# Patient Record
Sex: FEMALE | Race: WHITE | NOT HISPANIC OR LATINO
[De-identification: names, ages, dates, MRNs, and addresses within clinical notes are randomized per-mention and may not be internally consistent; named-entity substitution may affect disease eponyms.]

---

## 2018-04-05 ENCOUNTER — LABORATORY RESULT (OUTPATIENT)
Age: 48
End: 2018-04-05

## 2018-04-05 ENCOUNTER — APPOINTMENT (OUTPATIENT)
Dept: OBGYN | Facility: CLINIC | Age: 48
End: 2018-04-05
Payer: COMMERCIAL

## 2018-04-05 ENCOUNTER — OUTPATIENT (OUTPATIENT)
Dept: OUTPATIENT SERVICES | Facility: HOSPITAL | Age: 48
LOS: 1 days | Discharge: HOME | End: 2018-04-05

## 2018-04-05 VITALS — BODY MASS INDEX: 25.76 KG/M2 | WEIGHT: 140 LBS | HEIGHT: 62 IN

## 2018-04-05 DIAGNOSIS — Z01.419 ENCOUNTER FOR GYNECOLOGICAL EXAMINATION (GENERAL) (ROUTINE) WITHOUT ABNORMAL FINDINGS: ICD-10-CM

## 2018-04-05 PROCEDURE — 99396 PREV VISIT EST AGE 40-64: CPT

## 2018-04-05 PROCEDURE — 81003 URINALYSIS AUTO W/O SCOPE: CPT | Mod: QW

## 2018-04-22 ENCOUNTER — RX RENEWAL (OUTPATIENT)
Age: 48
End: 2018-04-22

## 2018-10-02 ENCOUNTER — APPOINTMENT (OUTPATIENT)
Dept: OBGYN | Facility: CLINIC | Age: 48
End: 2018-10-02
Payer: COMMERCIAL

## 2018-10-02 VITALS — WEIGHT: 145 LBS | BODY MASS INDEX: 26.68 KG/M2 | HEIGHT: 62 IN

## 2018-10-02 PROCEDURE — 99213 OFFICE O/P EST LOW 20 MIN: CPT

## 2018-11-11 LAB
BILIRUB UR QL STRIP: NORMAL
CLARITY UR: CLEAR
GLUCOSE UR-MCNC: NORMAL
HCG UR QL: NORMAL EU/DL
HGB UR QL STRIP.AUTO: NORMAL
KETONES UR-MCNC: NORMAL
LEUKOCYTE ESTERASE UR QL STRIP: NORMAL
NITRITE UR QL STRIP: NORMAL
PH UR STRIP: 5
PROT UR STRIP-MCNC: NORMAL
SP GR UR STRIP: 1.02

## 2019-04-08 ENCOUNTER — OUTPATIENT (OUTPATIENT)
Dept: OUTPATIENT SERVICES | Facility: HOSPITAL | Age: 49
LOS: 1 days | Discharge: HOME | End: 2019-04-08

## 2019-04-08 VITALS
TEMPERATURE: 98 F | HEART RATE: 72 BPM | SYSTOLIC BLOOD PRESSURE: 126 MMHG | DIASTOLIC BLOOD PRESSURE: 75 MMHG | WEIGHT: 147.05 LBS | HEIGHT: 62 IN | OXYGEN SATURATION: 99 % | RESPIRATION RATE: 18 BRPM

## 2019-04-08 DIAGNOSIS — Z01.818 ENCOUNTER FOR OTHER PREPROCEDURAL EXAMINATION: ICD-10-CM

## 2019-04-08 DIAGNOSIS — Z98.890 OTHER SPECIFIED POSTPROCEDURAL STATES: Chronic | ICD-10-CM

## 2019-04-08 DIAGNOSIS — M20.21 HALLUX RIGIDUS, RIGHT FOOT: ICD-10-CM

## 2019-04-08 LAB
ALBUMIN SERPL ELPH-MCNC: 3.7 G/DL — SIGNIFICANT CHANGE UP (ref 3.5–5.2)
ALP SERPL-CCNC: 38 U/L — SIGNIFICANT CHANGE UP (ref 30–115)
ALT FLD-CCNC: 11 U/L — SIGNIFICANT CHANGE UP (ref 0–41)
ANION GAP SERPL CALC-SCNC: 13 MMOL/L — SIGNIFICANT CHANGE UP (ref 7–14)
APPEARANCE UR: ABNORMAL
APTT BLD: 27.8 SEC — SIGNIFICANT CHANGE UP (ref 27–39.2)
AST SERPL-CCNC: 13 U/L — SIGNIFICANT CHANGE UP (ref 0–41)
BASOPHILS # BLD AUTO: 0.03 K/UL — SIGNIFICANT CHANGE UP (ref 0–0.2)
BASOPHILS NFR BLD AUTO: 0.5 % — SIGNIFICANT CHANGE UP (ref 0–1)
BILIRUB SERPL-MCNC: 0.4 MG/DL — SIGNIFICANT CHANGE UP (ref 0.2–1.2)
BILIRUB UR-MCNC: NEGATIVE — SIGNIFICANT CHANGE UP
BUN SERPL-MCNC: 17 MG/DL — SIGNIFICANT CHANGE UP (ref 10–20)
CALCIUM SERPL-MCNC: 8.8 MG/DL — SIGNIFICANT CHANGE UP (ref 8.5–10.1)
CHLORIDE SERPL-SCNC: 104 MMOL/L — SIGNIFICANT CHANGE UP (ref 98–110)
CO2 SERPL-SCNC: 22 MMOL/L — SIGNIFICANT CHANGE UP (ref 17–32)
COLOR SPEC: YELLOW — SIGNIFICANT CHANGE UP
CREAT SERPL-MCNC: 0.6 MG/DL — LOW (ref 0.7–1.5)
DIFF PNL FLD: NEGATIVE — SIGNIFICANT CHANGE UP
EOSINOPHIL # BLD AUTO: 0.24 K/UL — SIGNIFICANT CHANGE UP (ref 0–0.7)
EOSINOPHIL NFR BLD AUTO: 3.8 % — SIGNIFICANT CHANGE UP (ref 0–8)
EPI CELLS # UR: ABNORMAL /HPF
GLUCOSE SERPL-MCNC: 85 MG/DL — SIGNIFICANT CHANGE UP (ref 70–99)
GLUCOSE UR QL: NEGATIVE — SIGNIFICANT CHANGE UP
HCT VFR BLD CALC: 41.2 % — SIGNIFICANT CHANGE UP (ref 37–47)
HGB BLD-MCNC: 13.6 G/DL — SIGNIFICANT CHANGE UP (ref 12–16)
IMM GRANULOCYTES NFR BLD AUTO: 0.5 % — HIGH (ref 0.1–0.3)
INR BLD: 0.98 RATIO — SIGNIFICANT CHANGE UP (ref 0.65–1.3)
KETONES UR-MCNC: NEGATIVE — SIGNIFICANT CHANGE UP
LEUKOCYTE ESTERASE UR-ACNC: NEGATIVE — SIGNIFICANT CHANGE UP
LYMPHOCYTES # BLD AUTO: 1.62 K/UL — SIGNIFICANT CHANGE UP (ref 1.2–3.4)
LYMPHOCYTES # BLD AUTO: 25.4 % — SIGNIFICANT CHANGE UP (ref 20.5–51.1)
MCHC RBC-ENTMCNC: 30.4 PG — SIGNIFICANT CHANGE UP (ref 27–31)
MCHC RBC-ENTMCNC: 33 G/DL — SIGNIFICANT CHANGE UP (ref 32–37)
MCV RBC AUTO: 92.2 FL — SIGNIFICANT CHANGE UP (ref 81–99)
MONOCYTES # BLD AUTO: 0.67 K/UL — HIGH (ref 0.1–0.6)
MONOCYTES NFR BLD AUTO: 10.5 % — HIGH (ref 1.7–9.3)
NEUTROPHILS # BLD AUTO: 3.8 K/UL — SIGNIFICANT CHANGE UP (ref 1.4–6.5)
NEUTROPHILS NFR BLD AUTO: 59.3 % — SIGNIFICANT CHANGE UP (ref 42.2–75.2)
NITRITE UR-MCNC: NEGATIVE — SIGNIFICANT CHANGE UP
NRBC # BLD: 0 /100 WBCS — SIGNIFICANT CHANGE UP (ref 0–0)
PH UR: 6 — SIGNIFICANT CHANGE UP (ref 5–8)
PLATELET # BLD AUTO: 363 K/UL — SIGNIFICANT CHANGE UP (ref 130–400)
POTASSIUM SERPL-MCNC: 4.4 MMOL/L — SIGNIFICANT CHANGE UP (ref 3.5–5)
POTASSIUM SERPL-SCNC: 4.4 MMOL/L — SIGNIFICANT CHANGE UP (ref 3.5–5)
PROT SERPL-MCNC: 6.8 G/DL — SIGNIFICANT CHANGE UP (ref 6–8)
PROT UR-MCNC: NEGATIVE — SIGNIFICANT CHANGE UP
PROTHROM AB SERPL-ACNC: 11.3 SEC — SIGNIFICANT CHANGE UP (ref 9.95–12.87)
RBC # BLD: 4.47 M/UL — SIGNIFICANT CHANGE UP (ref 4.2–5.4)
RBC # FLD: 13.2 % — SIGNIFICANT CHANGE UP (ref 11.5–14.5)
RBC CASTS # UR COMP ASSIST: SIGNIFICANT CHANGE UP /HPF
SODIUM SERPL-SCNC: 139 MMOL/L — SIGNIFICANT CHANGE UP (ref 135–146)
SP GR SPEC: >=1.03 — SIGNIFICANT CHANGE UP (ref 1.01–1.03)
UROBILINOGEN FLD QL: 0.2 — SIGNIFICANT CHANGE UP (ref 0.2–0.2)
WBC # BLD: 6.39 K/UL — SIGNIFICANT CHANGE UP (ref 4.8–10.8)
WBC # FLD AUTO: 6.39 K/UL — SIGNIFICANT CHANGE UP (ref 4.8–10.8)

## 2019-04-15 ENCOUNTER — APPOINTMENT (OUTPATIENT)
Dept: OBGYN | Facility: CLINIC | Age: 49
End: 2019-04-15
Payer: COMMERCIAL

## 2019-04-15 ENCOUNTER — OUTPATIENT (OUTPATIENT)
Dept: OUTPATIENT SERVICES | Facility: HOSPITAL | Age: 49
LOS: 1 days | Discharge: HOME | End: 2019-04-15

## 2019-04-15 ENCOUNTER — LABORATORY RESULT (OUTPATIENT)
Age: 49
End: 2019-04-15

## 2019-04-15 VITALS — WEIGHT: 150 LBS | BODY MASS INDEX: 27.6 KG/M2 | HEIGHT: 62 IN

## 2019-04-15 DIAGNOSIS — Z01.419 ENCOUNTER FOR GYNECOLOGICAL EXAMINATION (GENERAL) (ROUTINE) WITHOUT ABNORMAL FINDINGS: ICD-10-CM

## 2019-04-15 DIAGNOSIS — Z98.890 OTHER SPECIFIED POSTPROCEDURAL STATES: Chronic | ICD-10-CM

## 2019-04-15 PROBLEM — R94.31 ABNORMAL ELECTROCARDIOGRAM [ECG] [EKG]: Chronic | Status: ACTIVE | Noted: 2019-04-08

## 2019-04-15 PROCEDURE — 99396 PREV VISIT EST AGE 40-64: CPT

## 2019-04-15 PROCEDURE — 81003 URINALYSIS AUTO W/O SCOPE: CPT | Mod: QW

## 2019-04-19 ENCOUNTER — OUTPATIENT (OUTPATIENT)
Dept: OUTPATIENT SERVICES | Facility: HOSPITAL | Age: 49
LOS: 1 days | Discharge: HOME | End: 2019-04-19
Payer: COMMERCIAL

## 2019-04-19 ENCOUNTER — RESULT REVIEW (OUTPATIENT)
Age: 49
End: 2019-04-19

## 2019-04-19 VITALS
DIASTOLIC BLOOD PRESSURE: 60 MMHG | SYSTOLIC BLOOD PRESSURE: 120 MMHG | HEART RATE: 74 BPM | WEIGHT: 149.03 LBS | TEMPERATURE: 98 F | RESPIRATION RATE: 17 BRPM | OXYGEN SATURATION: 98 % | HEIGHT: 62 IN

## 2019-04-19 VITALS — SYSTOLIC BLOOD PRESSURE: 122 MMHG | DIASTOLIC BLOOD PRESSURE: 64 MMHG | HEART RATE: 74 BPM | RESPIRATION RATE: 18 BRPM

## 2019-04-19 DIAGNOSIS — M21.612 BUNION OF LEFT FOOT: ICD-10-CM

## 2019-04-19 DIAGNOSIS — Z98.890 OTHER SPECIFIED POSTPROCEDURAL STATES: Chronic | ICD-10-CM

## 2019-04-19 PROCEDURE — 88311 DECALCIFY TISSUE: CPT | Mod: 26

## 2019-04-19 PROCEDURE — 88304 TISSUE EXAM BY PATHOLOGIST: CPT | Mod: 26

## 2019-04-19 RX ORDER — ONDANSETRON 8 MG/1
4 TABLET, FILM COATED ORAL ONCE
Qty: 0 | Refills: 0 | Status: DISCONTINUED | OUTPATIENT
Start: 2019-04-19 | End: 2019-04-19

## 2019-04-19 RX ORDER — OXYCODONE AND ACETAMINOPHEN 5; 325 MG/1; MG/1
1 TABLET ORAL EVERY 4 HOURS
Qty: 0 | Refills: 0 | Status: DISCONTINUED | OUTPATIENT
Start: 2019-04-19 | End: 2019-04-19

## 2019-04-19 RX ORDER — MORPHINE SULFATE 50 MG/1
2 CAPSULE, EXTENDED RELEASE ORAL
Qty: 0 | Refills: 0 | Status: DISCONTINUED | OUTPATIENT
Start: 2019-04-19 | End: 2019-04-19

## 2019-04-19 RX ORDER — SODIUM CHLORIDE 9 MG/ML
1000 INJECTION, SOLUTION INTRAVENOUS
Qty: 0 | Refills: 0 | Status: DISCONTINUED | OUTPATIENT
Start: 2019-04-19 | End: 2019-04-19

## 2019-04-19 NOTE — PRE-ANESTHESIA EVALUATION ADULT - NSANTHOSAYNRD_GEN_A_CORE
see barak sheet/No. BARAK screening performed.  STOP BANG Legend: 0-2 = LOW Risk; 3-4 = INTERMEDIATE Risk; 5-8 = HIGH Risk

## 2019-04-19 NOTE — PROGRESS NOTE ADULT - SUBJECTIVE AND OBJECTIVE BOX
Podiatry Pre-Operative Note     [id number] ASHLEY HAMM is a 49y year old Female patient presenting to the operating room for surgical management of the Left foot. The patient has failed all conservative measures and requires surgical intervention at this time.    PAST MEDICAL & SURGICAL HISTORY:  EKG, abnormal  History of surgery: right bunion sx, gyn procedure    Medications:   Taytulla 1 mg-20 mcg oral capsule: 1 cap(s) orally once a day    Allergies  penicillins (Unknown)    Consent [Done]  H&P [PAST]  Medical Clearance [PAST]    Surgeon: Dr. Anthony Krueger DPM  Assistant (s): Dr. Kvng Hodge DPM  Pre Operative Diagnosis: Recalcitrant painful Left foot bunion deformity  Planned Procedure: Left [place] Bunion with screw fixation     The patient has been educated on the above procedure, with all risks and benefits described. No guarantees were given or implied for the aforementioned procedure.  The above assistant(s) have introduced themselves to the patient. The patient consents to their participation in the procedure listed above.   04-19-19 @ 08:09

## 2019-04-19 NOTE — ASU DISCHARGE PLAN (ADULT/PEDIATRIC) - CALL YOUR DOCTOR IF YOU HAVE ANY OF THE FOLLOWING:
Nausea and vomiting that does not stop/Unable to urinate/Wound/Surgical Site with redness, or foul smelling discharge or pus/Increased irritability or sluggishness/Inability to tolerate liquids or foods/Bleeding that does not stop/Excessive diarrhea/Numbness, tingling, color or temperature change to extremity/Swelling that gets worse/Pain not relieved by Medications

## 2019-04-19 NOTE — ASU DISCHARGE PLAN (ADULT/PEDIATRIC) - ASU DC SPECIAL INSTRUCTIONSFT
Please keep Left foot dressing clean dry and intact.   Please follow up with Dr. Anthony Krueger in one week.

## 2019-04-21 LAB
BILIRUB UR QL STRIP: NORMAL
CLARITY UR: CLEAR
GLUCOSE UR-MCNC: NORMAL
HCG UR QL: NORMAL EU/DL
HGB UR QL STRIP.AUTO: NORMAL
KETONES UR-MCNC: NORMAL
LEUKOCYTE ESTERASE UR QL STRIP: 25
NITRITE UR QL STRIP: NORMAL
PH UR STRIP: 8
PROT UR STRIP-MCNC: NORMAL
SP GR UR STRIP: 1.02

## 2019-04-23 LAB — SURGICAL PATHOLOGY STUDY: SIGNIFICANT CHANGE UP

## 2019-04-26 DIAGNOSIS — M19.072 PRIMARY OSTEOARTHRITIS, LEFT ANKLE AND FOOT: ICD-10-CM

## 2019-04-26 DIAGNOSIS — M20.5X2 OTHER DEFORMITIES OF TOE(S) (ACQUIRED), LEFT FOOT: ICD-10-CM

## 2019-04-26 DIAGNOSIS — Z88.0 ALLERGY STATUS TO PENICILLIN: ICD-10-CM

## 2019-09-16 ENCOUNTER — APPOINTMENT (OUTPATIENT)
Dept: OBGYN | Facility: CLINIC | Age: 49
End: 2019-09-16

## 2019-10-14 ENCOUNTER — APPOINTMENT (OUTPATIENT)
Dept: OBGYN | Facility: CLINIC | Age: 49
End: 2019-10-14
Payer: COMMERCIAL

## 2019-10-14 VITALS — HEIGHT: 62 IN | WEIGHT: 144 LBS | BODY MASS INDEX: 26.5 KG/M2

## 2019-10-14 PROCEDURE — 77085 DXA BONE DENSITY AXL VRT FX: CPT

## 2019-10-14 PROCEDURE — 81003 URINALYSIS AUTO W/O SCOPE: CPT | Mod: QW

## 2019-10-14 PROCEDURE — 99213 OFFICE O/P EST LOW 20 MIN: CPT

## 2019-11-02 LAB
BILIRUB UR QL STRIP: NORMAL
CLARITY UR: CLEAR
GLUCOSE UR-MCNC: NORMAL
HCG UR QL: NORMAL EU/DL
HGB UR QL STRIP.AUTO: NORMAL
KETONES UR-MCNC: NORMAL
LEUKOCYTE ESTERASE UR QL STRIP: NORMAL
NITRITE UR QL STRIP: NORMAL
PH UR STRIP: 5
PROT UR STRIP-MCNC: NORMAL
SP GR UR STRIP: 1

## 2019-12-17 NOTE — H&P PST ADULT - TEMPERATURE IN CELSIUS (DEGREES C)
----- Message from Wali Umana MD sent at 12/17/2019  8:10 AM CST -----  Lab recheck:    Her creatinine, which looks at kidney function, increased from 10 to 30. This is over a 30% increase, so I want to decrease her dose from 5 to 3mg/kg for a month. This calculates to be 100mg BID or 1 pill twice daily (previously was on 2 pills twice daily).    She also has hyperuricemia, or high uric acid which is also a side effect of the cyclosporine. This likely is 2/2 to her kidney function not eliminating the excess uric acid. As long as she has no side effects, no intervention is needed. However, any issues with urination including pain in her back/side/abdomen, nausea, pain or difficulty urinating, as well as symptoms of gout which is joint pain in fingers, let us know.    Lipid level shows an increase in triglycerides, also a side effect of cyclosporine. A diet high in fruits/vegetables and lower in red meat and fast food will help this, no other intervention necessary at this time.    Her Magnesium level is wnl with oral supplementation.    CBC is unchanged, liver function also unchanged.    F/u as planned.     36.8

## 2020-02-25 ENCOUNTER — APPOINTMENT (OUTPATIENT)
Dept: OBGYN | Facility: CLINIC | Age: 50
End: 2020-02-25
Payer: COMMERCIAL

## 2020-02-25 ENCOUNTER — LABORATORY RESULT (OUTPATIENT)
Age: 50
End: 2020-02-25

## 2020-02-25 VITALS — HEIGHT: 62 IN | BODY MASS INDEX: 26.87 KG/M2 | WEIGHT: 146 LBS

## 2020-02-25 PROCEDURE — 58100 BIOPSY OF UTERUS LINING: CPT

## 2020-02-25 PROCEDURE — 99213 OFFICE O/P EST LOW 20 MIN: CPT | Mod: 25

## 2020-03-06 ENCOUNTER — APPOINTMENT (OUTPATIENT)
Dept: OBGYN | Facility: CLINIC | Age: 50
End: 2020-03-06
Payer: COMMERCIAL

## 2020-03-06 PROCEDURE — 76830 TRANSVAGINAL US NON-OB: CPT

## 2020-03-06 PROCEDURE — 93975 VASCULAR STUDY: CPT

## 2020-06-19 ENCOUNTER — OUTPATIENT (OUTPATIENT)
Dept: OUTPATIENT SERVICES | Facility: HOSPITAL | Age: 50
LOS: 1 days | Discharge: HOME | End: 2020-06-19
Payer: COMMERCIAL

## 2020-06-19 VITALS
WEIGHT: 154.1 LBS | SYSTOLIC BLOOD PRESSURE: 133 MMHG | OXYGEN SATURATION: 99 % | HEART RATE: 68 BPM | TEMPERATURE: 98 F | DIASTOLIC BLOOD PRESSURE: 75 MMHG | HEIGHT: 61 IN | RESPIRATION RATE: 16 BRPM

## 2020-06-19 DIAGNOSIS — Z01.818 ENCOUNTER FOR OTHER PREPROCEDURAL EXAMINATION: ICD-10-CM

## 2020-06-19 DIAGNOSIS — N84.0 POLYP OF CORPUS UTERI: ICD-10-CM

## 2020-06-19 DIAGNOSIS — Z98.890 OTHER SPECIFIED POSTPROCEDURAL STATES: Chronic | ICD-10-CM

## 2020-06-19 LAB
ALBUMIN SERPL ELPH-MCNC: 4.2 G/DL — SIGNIFICANT CHANGE UP (ref 3.5–5.2)
ALP SERPL-CCNC: 69 U/L — SIGNIFICANT CHANGE UP (ref 30–115)
ALT FLD-CCNC: 20 U/L — SIGNIFICANT CHANGE UP (ref 0–41)
ANION GAP SERPL CALC-SCNC: 15 MMOL/L — HIGH (ref 7–14)
APPEARANCE UR: CLEAR — SIGNIFICANT CHANGE UP
APTT BLD: 30.7 SEC — SIGNIFICANT CHANGE UP (ref 27–39.2)
AST SERPL-CCNC: 26 U/L — SIGNIFICANT CHANGE UP (ref 0–41)
BASOPHILS # BLD AUTO: 0.04 K/UL — SIGNIFICANT CHANGE UP (ref 0–0.2)
BASOPHILS NFR BLD AUTO: 0.7 % — SIGNIFICANT CHANGE UP (ref 0–1)
BILIRUB SERPL-MCNC: 0.3 MG/DL — SIGNIFICANT CHANGE UP (ref 0.2–1.2)
BILIRUB UR-MCNC: NEGATIVE — SIGNIFICANT CHANGE UP
BUN SERPL-MCNC: 13 MG/DL — SIGNIFICANT CHANGE UP (ref 10–20)
CALCIUM SERPL-MCNC: 9.6 MG/DL — SIGNIFICANT CHANGE UP (ref 8.5–10.1)
CHLORIDE SERPL-SCNC: 100 MMOL/L — SIGNIFICANT CHANGE UP (ref 98–110)
CO2 SERPL-SCNC: 25 MMOL/L — SIGNIFICANT CHANGE UP (ref 17–32)
COLOR SPEC: YELLOW — SIGNIFICANT CHANGE UP
CREAT SERPL-MCNC: 0.7 MG/DL — SIGNIFICANT CHANGE UP (ref 0.7–1.5)
DIFF PNL FLD: NEGATIVE — SIGNIFICANT CHANGE UP
EOSINOPHIL # BLD AUTO: 0.2 K/UL — SIGNIFICANT CHANGE UP (ref 0–0.7)
EOSINOPHIL NFR BLD AUTO: 3.4 % — SIGNIFICANT CHANGE UP (ref 0–8)
GLUCOSE SERPL-MCNC: 91 MG/DL — SIGNIFICANT CHANGE UP (ref 70–99)
GLUCOSE UR QL: NEGATIVE — SIGNIFICANT CHANGE UP
HCT VFR BLD CALC: 42 % — SIGNIFICANT CHANGE UP (ref 37–47)
HGB BLD-MCNC: 13.2 G/DL — SIGNIFICANT CHANGE UP (ref 12–16)
IMM GRANULOCYTES NFR BLD AUTO: 0.2 % — SIGNIFICANT CHANGE UP (ref 0.1–0.3)
INR BLD: 0.95 RATIO — SIGNIFICANT CHANGE UP (ref 0.65–1.3)
KETONES UR-MCNC: NEGATIVE — SIGNIFICANT CHANGE UP
LEUKOCYTE ESTERASE UR-ACNC: NEGATIVE — SIGNIFICANT CHANGE UP
LYMPHOCYTES # BLD AUTO: 1.67 K/UL — SIGNIFICANT CHANGE UP (ref 1.2–3.4)
LYMPHOCYTES # BLD AUTO: 28.8 % — SIGNIFICANT CHANGE UP (ref 20.5–51.1)
MCHC RBC-ENTMCNC: 28.8 PG — SIGNIFICANT CHANGE UP (ref 27–31)
MCHC RBC-ENTMCNC: 31.4 G/DL — LOW (ref 32–37)
MCV RBC AUTO: 91.7 FL — SIGNIFICANT CHANGE UP (ref 81–99)
MONOCYTES # BLD AUTO: 0.7 K/UL — HIGH (ref 0.1–0.6)
MONOCYTES NFR BLD AUTO: 12.1 % — HIGH (ref 1.7–9.3)
NEUTROPHILS # BLD AUTO: 3.18 K/UL — SIGNIFICANT CHANGE UP (ref 1.4–6.5)
NEUTROPHILS NFR BLD AUTO: 54.8 % — SIGNIFICANT CHANGE UP (ref 42.2–75.2)
NITRITE UR-MCNC: NEGATIVE — SIGNIFICANT CHANGE UP
NRBC # BLD: 0 /100 WBCS — SIGNIFICANT CHANGE UP (ref 0–0)
PH UR: 5.5 — SIGNIFICANT CHANGE UP (ref 5–8)
PLATELET # BLD AUTO: 388 K/UL — SIGNIFICANT CHANGE UP (ref 130–400)
POTASSIUM SERPL-MCNC: 4.7 MMOL/L — SIGNIFICANT CHANGE UP (ref 3.5–5)
POTASSIUM SERPL-SCNC: 4.7 MMOL/L — SIGNIFICANT CHANGE UP (ref 3.5–5)
PROT SERPL-MCNC: 7.9 G/DL — SIGNIFICANT CHANGE UP (ref 6–8)
PROT UR-MCNC: SIGNIFICANT CHANGE UP
PROTHROM AB SERPL-ACNC: 10.9 SEC — SIGNIFICANT CHANGE UP (ref 9.95–12.87)
RBC # BLD: 4.58 M/UL — SIGNIFICANT CHANGE UP (ref 4.2–5.4)
RBC # FLD: 12.7 % — SIGNIFICANT CHANGE UP (ref 11.5–14.5)
SODIUM SERPL-SCNC: 140 MMOL/L — SIGNIFICANT CHANGE UP (ref 135–146)
SP GR SPEC: 1.02 — SIGNIFICANT CHANGE UP (ref 1.01–1.02)
UROBILINOGEN FLD QL: SIGNIFICANT CHANGE UP
WBC # BLD: 5.8 K/UL — SIGNIFICANT CHANGE UP (ref 4.8–10.8)
WBC # FLD AUTO: 5.8 K/UL — SIGNIFICANT CHANGE UP (ref 4.8–10.8)

## 2020-06-19 PROCEDURE — 93010 ELECTROCARDIOGRAM REPORT: CPT

## 2020-06-19 RX ORDER — NORETHINDRONE AND ETHINYL ESTRADIOL 0.4-0.035
1 KIT ORAL
Qty: 0 | Refills: 0 | DISCHARGE

## 2020-06-19 NOTE — H&P PST ADULT - HISTORY OF PRESENT ILLNESS
49 yo female presents w/ c/o "abnormal periods since july last year, my blood work is saying i should not even be getting my periods", scheduled for d&c;  denies chest pain, palpitations, shortness of breath, dyspnea, or dysuria. exercise tolerance: 4 + blocks/ flights of stairs w/o sob   pt denies any known exposure to COVID-19, denies any S&S

## 2020-06-20 ENCOUNTER — LABORATORY RESULT (OUTPATIENT)
Age: 50
End: 2020-06-20

## 2020-06-22 ENCOUNTER — APPOINTMENT (OUTPATIENT)
Dept: CARDIOLOGY | Facility: CLINIC | Age: 50
End: 2020-06-22
Payer: COMMERCIAL

## 2020-06-22 PROCEDURE — 99213 OFFICE O/P EST LOW 20 MIN: CPT

## 2020-06-22 PROCEDURE — 93000 ELECTROCARDIOGRAM COMPLETE: CPT

## 2020-06-23 ENCOUNTER — APPOINTMENT (OUTPATIENT)
Dept: OBGYN | Facility: HOSPITAL | Age: 50
End: 2020-06-23

## 2020-06-23 ENCOUNTER — OUTPATIENT (OUTPATIENT)
Dept: OUTPATIENT SERVICES | Facility: HOSPITAL | Age: 50
LOS: 1 days | Discharge: HOME | End: 2020-06-23
Payer: COMMERCIAL

## 2020-06-23 ENCOUNTER — RESULT REVIEW (OUTPATIENT)
Age: 50
End: 2020-06-23

## 2020-06-23 VITALS
HEIGHT: 61 IN | WEIGHT: 154.1 LBS | DIASTOLIC BLOOD PRESSURE: 60 MMHG | TEMPERATURE: 98 F | RESPIRATION RATE: 20 BRPM | HEART RATE: 67 BPM | OXYGEN SATURATION: 100 % | SYSTOLIC BLOOD PRESSURE: 122 MMHG

## 2020-06-23 VITALS
SYSTOLIC BLOOD PRESSURE: 138 MMHG | DIASTOLIC BLOOD PRESSURE: 79 MMHG | RESPIRATION RATE: 18 BRPM | OXYGEN SATURATION: 100 % | HEART RATE: 60 BPM

## 2020-06-23 DIAGNOSIS — Z98.890 OTHER SPECIFIED POSTPROCEDURAL STATES: Chronic | ICD-10-CM

## 2020-06-23 PROCEDURE — 58558 HYSTEROSCOPY BIOPSY: CPT

## 2020-06-23 PROCEDURE — 88305 TISSUE EXAM BY PATHOLOGIST: CPT | Mod: 26

## 2020-06-23 RX ORDER — ONDANSETRON 8 MG/1
4 TABLET, FILM COATED ORAL ONCE
Refills: 0 | Status: DISCONTINUED | OUTPATIENT
Start: 2020-06-23 | End: 2020-07-08

## 2020-06-23 RX ORDER — SODIUM CHLORIDE 9 MG/ML
1000 INJECTION, SOLUTION INTRAVENOUS
Refills: 0 | Status: DISCONTINUED | OUTPATIENT
Start: 2020-06-23 | End: 2020-07-08

## 2020-06-23 RX ORDER — IBUPROFEN 200 MG
600 TABLET ORAL ONCE
Refills: 0 | Status: DISCONTINUED | OUTPATIENT
Start: 2020-06-23 | End: 2020-07-08

## 2020-06-23 RX ORDER — HYDROMORPHONE HYDROCHLORIDE 2 MG/ML
0.5 INJECTION INTRAMUSCULAR; INTRAVENOUS; SUBCUTANEOUS
Refills: 0 | Status: DISCONTINUED | OUTPATIENT
Start: 2020-06-23 | End: 2020-06-23

## 2020-06-23 RX ADMIN — SODIUM CHLORIDE 100 MILLILITER(S): 9 INJECTION, SOLUTION INTRAVENOUS at 08:09

## 2020-06-23 NOTE — PRE-ANESTHESIA EVALUATION ADULT - NSANTHOSAYNRD_GEN_A_CORE
never tested, see screening tool/No. SAM screening performed.  STOP BANG Legend: 0-2 = LOW Risk; 3-4 = INTERMEDIATE Risk; 5-8 = HIGH Risk

## 2020-06-23 NOTE — ASU DISCHARGE PLAN (ADULT/PEDIATRIC) - CARE PROVIDER_API CALL
Alfredo Martin  OBSTETRICS AND GYNECOLOGY  [address] [place] [address]  Phone: [phone]  Fax: [phone]  Follow Up Time: 2 weeks

## 2020-06-23 NOTE — BRIEF OPERATIVE NOTE - NSICDXBRIEFPOSTOP_GEN_ALL_CORE_FT
POST-OP DIAGNOSIS:  Atrophic endometrium 23-Jun-2020 08:11:53  Jos Layton  Postmenopausal bleeding 23-Jun-2020 08:11:41  Jos Layton

## 2020-06-23 NOTE — BRIEF OPERATIVE NOTE - NSICDXBRIEFPREOP_GEN_ALL_CORE_FT
PRE-OP DIAGNOSIS:  Thickened endometrium 23-Jun-2020 08:11:16  Jos Layton  Postmenopausal bleeding 23-Jun-2020 08:10:47  Jos Layton

## 2020-06-23 NOTE — BRIEF OPERATIVE NOTE - NSICDXBRIEFPROCEDURE_GEN_ALL_CORE_FT
PROCEDURES:  Hysteroscopy, diagnostic, with dilation and curettage of uterus 23-Jun-2020 08:10:22  Jos Layton

## 2020-06-23 NOTE — BRIEF OPERATIVE NOTE - OPERATION/FINDINGS
10week retroverted uterus, normal cervix, scant endometrial tissue, polypoid-like structure near the right ostium

## 2020-06-24 LAB — SURGICAL PATHOLOGY STUDY: SIGNIFICANT CHANGE UP

## 2020-06-26 DIAGNOSIS — Z88.0 ALLERGY STATUS TO PENICILLIN: ICD-10-CM

## 2020-06-26 DIAGNOSIS — N95.0 POSTMENOPAUSAL BLEEDING: ICD-10-CM

## 2020-06-26 DIAGNOSIS — N85.8 OTHER SPECIFIED NONINFLAMMATORY DISORDERS OF UTERUS: ICD-10-CM

## 2020-07-13 ENCOUNTER — APPOINTMENT (OUTPATIENT)
Dept: OBGYN | Facility: CLINIC | Age: 50
End: 2020-07-13
Payer: COMMERCIAL

## 2020-07-13 VITALS — TEMPERATURE: 98 F | WEIGHT: 153 LBS | HEIGHT: 61 IN | BODY MASS INDEX: 28.89 KG/M2

## 2020-07-13 LAB
BILIRUB UR QL STRIP: NORMAL
GLUCOSE UR-MCNC: NORMAL
HCG UR QL: NORMAL EU/DL
HGB UR QL STRIP.AUTO: NORMAL
KETONES UR-MCNC: NORMAL
LEUKOCYTE ESTERASE UR QL STRIP: 500
NITRITE UR QL STRIP: NORMAL
PH UR STRIP: 6
PROT UR STRIP-MCNC: NORMAL
SP GR UR STRIP: 1.01

## 2020-07-13 PROCEDURE — 99024 POSTOP FOLLOW-UP VISIT: CPT

## 2020-07-27 DIAGNOSIS — B00.9 HERPESVIRAL INFECTION, UNSPECIFIED: ICD-10-CM

## 2020-09-08 ENCOUNTER — APPOINTMENT (OUTPATIENT)
Dept: OBGYN | Facility: CLINIC | Age: 50
End: 2020-09-08
Payer: COMMERCIAL

## 2020-09-08 PROCEDURE — 76830 TRANSVAGINAL US NON-OB: CPT

## 2020-09-08 PROCEDURE — 76856 US EXAM PELVIC COMPLETE: CPT | Mod: 59

## 2020-09-20 ENCOUNTER — LABORATORY RESULT (OUTPATIENT)
Age: 50
End: 2020-09-20

## 2020-09-21 ENCOUNTER — APPOINTMENT (OUTPATIENT)
Dept: OBGYN | Facility: CLINIC | Age: 50
End: 2020-09-21
Payer: COMMERCIAL

## 2020-09-21 VITALS — BODY MASS INDEX: 29.27 KG/M2 | HEIGHT: 61 IN | WEIGHT: 155 LBS | TEMPERATURE: 97.7 F

## 2020-09-21 LAB
BILIRUB UR QL STRIP: NORMAL
CLARITY UR: CLEAR
GLUCOSE UR-MCNC: NORMAL
HCG UR QL: NORMAL EU/DL
HGB UR QL STRIP.AUTO: NORMAL
KETONES UR-MCNC: NORMAL
LEUKOCYTE ESTERASE UR QL STRIP: NORMAL
NITRITE UR QL STRIP: NORMAL
PH UR STRIP: 5.5
PROT UR STRIP-MCNC: NORMAL
SP GR UR STRIP: 1.03

## 2020-09-21 PROCEDURE — 99396 PREV VISIT EST AGE 40-64: CPT

## 2020-09-21 PROCEDURE — 81003 URINALYSIS AUTO W/O SCOPE: CPT | Mod: QW

## 2021-01-12 NOTE — ASU PREOP CHECKLIST - SURGICAL CONSENT
[Dear  ___] : Dear  [unfilled], [Consult Letter:] : I had the pleasure of evaluating your patient, [unfilled]. [Please see my note below.] : Please see my note below. [Consult Closing:] : Thank you very much for allowing me to participate in the care of this patient.  If you have any questions, please do not hesitate to contact me. [Sincerely,] : Sincerely, [FreeTextEntry2] : Dr Yassine Zazueta [FreeTextEntry3] : \par Michael De La Cruz MD, FACS\par \par Otolaryngology-Head and Neck Surgery\par [employer] [facility]\par  done

## 2021-02-15 ENCOUNTER — TRANSCRIPTION ENCOUNTER (OUTPATIENT)
Age: 51
End: 2021-02-15

## 2021-03-14 ENCOUNTER — TRANSCRIPTION ENCOUNTER (OUTPATIENT)
Age: 51
End: 2021-03-14

## 2021-07-04 ENCOUNTER — TRANSCRIPTION ENCOUNTER (OUTPATIENT)
Age: 51
End: 2021-07-04

## 2021-09-26 ENCOUNTER — LABORATORY RESULT (OUTPATIENT)
Age: 51
End: 2021-09-26

## 2021-09-27 ENCOUNTER — APPOINTMENT (OUTPATIENT)
Dept: OBGYN | Facility: CLINIC | Age: 51
End: 2021-09-27
Payer: COMMERCIAL

## 2021-09-27 VITALS — TEMPERATURE: 98 F | WEIGHT: 157 LBS | HEIGHT: 61 IN | BODY MASS INDEX: 29.64 KG/M2

## 2021-09-27 PROCEDURE — 99396 PREV VISIT EST AGE 40-64: CPT

## 2021-10-25 ENCOUNTER — APPOINTMENT (OUTPATIENT)
Dept: OBGYN | Facility: CLINIC | Age: 51
End: 2021-10-25
Payer: COMMERCIAL

## 2021-10-25 PROCEDURE — 77085 DXA BONE DENSITY AXL VRT FX: CPT

## 2021-11-26 ENCOUNTER — TRANSCRIPTION ENCOUNTER (OUTPATIENT)
Age: 51
End: 2021-11-26

## 2021-12-07 ENCOUNTER — NON-APPOINTMENT (OUTPATIENT)
Age: 51
End: 2021-12-07

## 2021-12-13 ENCOUNTER — NON-APPOINTMENT (OUTPATIENT)
Age: 51
End: 2021-12-13

## 2021-12-20 ENCOUNTER — TRANSCRIPTION ENCOUNTER (OUTPATIENT)
Age: 51
End: 2021-12-20

## 2021-12-21 ENCOUNTER — APPOINTMENT (OUTPATIENT)
Dept: OBGYN | Facility: CLINIC | Age: 51
End: 2021-12-21
Payer: COMMERCIAL

## 2021-12-21 VITALS — TEMPERATURE: 97.7 F | BODY MASS INDEX: 30.4 KG/M2 | WEIGHT: 161 LBS | HEIGHT: 61 IN

## 2021-12-21 PROCEDURE — 99213 OFFICE O/P EST LOW 20 MIN: CPT

## 2022-01-11 ENCOUNTER — NON-APPOINTMENT (OUTPATIENT)
Age: 52
End: 2022-01-11

## 2022-02-17 NOTE — ASU PREOP CHECKLIST - BSA (M2)
Include Z78.9 (Other Specified Conditions Influencing Health Status) As An Associated Diagnosis?: No Consent: The patient's consent was obtained including but not limited to risks of crusting, scabbing, blistering, scarring, darker or lighter pigmentary change, recurrence, incomplete removal and infection. Medical Necessity Clause: This procedure was medically necessary because the lesions that were treated were: Post-Care Instructions: I reviewed with the patient in detail post-care instructions. Patient is to wear sunprotection, and avoid picking at any of the treated lesions. Pt may apply Vaseline to crusted or scabbing areas. Detail Level: Zone Anesthesia Volume In Cc: 0 Medical Necessity Information: It is in your best interest to select a reason for this procedure from the list below. All of these items fulfill various CMS LCD requirements except the new and changing color options. Total Number Of Lesions Treated: 5 1.69

## 2022-04-27 ENCOUNTER — RX RENEWAL (OUTPATIENT)
Age: 52
End: 2022-04-27

## 2022-05-25 NOTE — ASU PATIENT PROFILE, ADULT - SURGERY TIME
NOTIFICATION RETURN TO WORK / SCHOOL    5/25/2022    Ms. Etelvina Andre  [address] [facility] [address] [place] [id number]      To Whom It May Concern:    Etelvina Andre   Patient was seen in our office today and will return to work 5/26/2022. If there are questions or concerns, please have the patient contact our office.         Sincerely,      Wisam Mejia MD
12:30

## 2022-05-27 ENCOUNTER — APPOINTMENT (OUTPATIENT)
Dept: UROGYNECOLOGY | Facility: CLINIC | Age: 52
End: 2022-05-27
Payer: COMMERCIAL

## 2022-05-27 VITALS
HEART RATE: 80 BPM | SYSTOLIC BLOOD PRESSURE: 128 MMHG | DIASTOLIC BLOOD PRESSURE: 85 MMHG | BODY MASS INDEX: 30.4 KG/M2 | WEIGHT: 161 LBS | HEIGHT: 61 IN

## 2022-05-27 DIAGNOSIS — Z87.898 PERSONAL HISTORY OF OTHER SPECIFIED CONDITIONS: ICD-10-CM

## 2022-05-27 DIAGNOSIS — R09.89 OTHER SPECIFIED SYMPTOMS AND SIGNS INVOLVING THE CIRCULATORY AND RESPIRATORY SYSTEMS: ICD-10-CM

## 2022-05-27 DIAGNOSIS — Z30.41 ENCOUNTER FOR SURVEILLANCE OF CONTRACEPTIVE PILLS: ICD-10-CM

## 2022-05-27 DIAGNOSIS — Z82.49 FAMILY HISTORY OF ISCHEMIC HEART DISEASE AND OTHER DISEASES OF THE CIRCULATORY SYSTEM: ICD-10-CM

## 2022-05-27 DIAGNOSIS — Z78.9 OTHER SPECIFIED HEALTH STATUS: ICD-10-CM

## 2022-05-27 DIAGNOSIS — Z80.1 FAMILY HISTORY OF MALIGNANT NEOPLASM OF TRACHEA, BRONCHUS AND LUNG: ICD-10-CM

## 2022-05-27 DIAGNOSIS — N39.490 OVERFLOW INCONTINENCE: ICD-10-CM

## 2022-05-27 DIAGNOSIS — Z87.42 PERSONAL HISTORY OF OTHER DISEASES OF THE FEMALE GENITAL TRACT: ICD-10-CM

## 2022-05-27 DIAGNOSIS — N92.0 EXCESSIVE AND FREQUENT MENSTRUATION WITH REGULAR CYCLE: ICD-10-CM

## 2022-05-27 DIAGNOSIS — N95.1 MENOPAUSAL AND FEMALE CLIMACTERIC STATES: ICD-10-CM

## 2022-05-27 DIAGNOSIS — Z83.42 FAMILY HISTORY OF FAMILIAL HYPERCHOLESTEROLEMIA: ICD-10-CM

## 2022-05-27 DIAGNOSIS — Z87.2 PERSONAL HISTORY OF DISEASES OF THE SKIN AND SUBCUTANEOUS TISSUE: ICD-10-CM

## 2022-05-27 DIAGNOSIS — Z30.9 ENCOUNTER FOR CONTRACEPTIVE MANAGEMENT, UNSPECIFIED: ICD-10-CM

## 2022-05-27 DIAGNOSIS — N91.1 SECONDARY AMENORRHEA: ICD-10-CM

## 2022-05-27 LAB
BILIRUB UR QL STRIP: NEGATIVE
CLARITY UR: CLEAR
COLLECTION METHOD: NORMAL
GLUCOSE UR-MCNC: NEGATIVE
HCG UR QL: 0.2 EU/DL
HGB UR QL STRIP.AUTO: NORMAL
KETONES UR-MCNC: NEGATIVE
LEUKOCYTE ESTERASE UR QL STRIP: NEGATIVE
NITRITE UR QL STRIP: NEGATIVE
PH UR STRIP: 6
PROT UR STRIP-MCNC: NEGATIVE
SP GR UR STRIP: 1

## 2022-05-27 PROCEDURE — 99205 OFFICE O/P NEW HI 60 MIN: CPT | Mod: 25

## 2022-05-27 PROCEDURE — 51701 INSERT BLADDER CATHETER: CPT

## 2022-05-27 PROCEDURE — 81003 URINALYSIS AUTO W/O SCOPE: CPT | Mod: QW

## 2022-05-27 RX ORDER — NORETHINDRONE ACETATE AND ETHINYL ESTRADIOL, AND FERROUS FUMARATE 1MG-20(24)
1-20 KIT ORAL
Qty: 84 | Refills: 1 | Status: COMPLETED | COMMUNITY
Start: 2018-10-02 | End: 2022-05-27

## 2022-05-27 RX ORDER — NORETHINDRONE ACETATE 5 MG/1
5 TABLET ORAL
Qty: 30 | Refills: 0 | Status: COMPLETED | COMMUNITY
Start: 2020-04-15 | End: 2022-05-27

## 2022-05-27 RX ORDER — NORETHINDRONE ACETATE AND ETHINYL ESTRADIOL, AND FERROUS FUMARATE 1MG-20(24)
1-20 KIT ORAL
Qty: 84 | Refills: 1 | Status: COMPLETED | COMMUNITY
Start: 2019-04-15 | End: 2022-05-27

## 2022-05-27 RX ORDER — DOXYCYCLINE HYCLATE 100 MG/1
100 TABLET ORAL
Qty: 10 | Refills: 0 | Status: COMPLETED | COMMUNITY
Start: 2021-09-27 | End: 2022-05-27

## 2022-05-27 RX ORDER — NORETHINDRONE ACETATE AND ETHINYL ESTRADIOL, AND FERROUS FUMARATE 1MG-20(24)
1-20 KIT ORAL
Qty: 84 | Refills: 0 | Status: COMPLETED | COMMUNITY
Start: 2018-04-22 | End: 2022-05-27

## 2022-05-27 RX ORDER — OXYBUTYNIN CHLORIDE 5 MG/1
5 TABLET ORAL EVERY 8 HOURS
Qty: 90 | Refills: 0 | Status: COMPLETED | COMMUNITY
Start: 2021-12-21 | End: 2022-05-27

## 2022-05-27 RX ORDER — NORETHINDRONE ACETATE AND ETHINYL ESTRADIOL, AND FERROUS FUMARATE 1MG-20(24)
1-20 KIT ORAL
Qty: 84 | Refills: 0 | Status: COMPLETED | COMMUNITY
Start: 2018-07-17 | End: 2022-05-27

## 2022-05-27 RX ORDER — ACYCLOVIR 50 MG/G
5 OINTMENT TOPICAL 4 TIMES DAILY
Qty: 1 | Refills: 3 | Status: COMPLETED | COMMUNITY
Start: 2020-07-27 | End: 2022-05-27

## 2022-05-27 RX ORDER — NORETHINDRONE ACETATE AND ETHINYL ESTRADIOL, AND FERROUS FUMARATE 1MG-20(24)
1-20 KIT ORAL
Qty: 84 | Refills: 0 | Status: COMPLETED | COMMUNITY
Start: 2019-03-12 | End: 2022-05-27

## 2022-05-28 LAB
APPEARANCE: CLEAR
BILIRUBIN URINE: NEGATIVE
BLOOD URINE: NEGATIVE
COLOR: COLORLESS
GLUCOSE QUALITATIVE U: NEGATIVE
KETONES URINE: NEGATIVE
LEUKOCYTE ESTERASE URINE: NEGATIVE
NITRITE URINE: NEGATIVE
PH URINE: 6.5
PROTEIN URINE: NEGATIVE
SPECIFIC GRAVITY URINE: 1
UROBILINOGEN URINE: NORMAL

## 2022-05-31 ENCOUNTER — NON-APPOINTMENT (OUTPATIENT)
Age: 52
End: 2022-05-31

## 2022-05-31 LAB — BACTERIA UR CULT: NORMAL

## 2022-06-04 NOTE — HISTORY OF PRESENT ILLNESS
[FreeTextEntry1] : \par Pt with pelvic floor dysfunction here for urogynecologic evaluation. She describes: \par \par Chief PFD: feeling of incomplete emptying\par \par Pelvic organ prolapse: no bulge, no splinting\par Stress urinary incontinence: denies\par Overactive bladder syndrome: s/p oxybutynin 5mg TID (stopped a few months ago), urinate q2hrs, incontinence 2-3 days/wk (sometimes without urge), ongoing for years, worsening, no nocturia, no glaucoma\par Voiding dysfunction: recently feeling some Incomplete bladder emptying, no hesitancy \par Lower urinary tract/vaginal symptoms: no UTIs past year, no hematuria, no dysuria, no bladder pain \par Fecal incontinence: some leakage after BM with loose stool (few times/month)\par Defecatory dysfunction: sausage-hard balls\par Sexual dysfunction: sexually active, leaking urine (positional, just prior to orgasm)\par Pelvic pain: denies\par Vaginal dryness: denies\par \par Her pelvic floor symptoms are significantly bothersome and negatively impacting her quality of life. \par \par

## 2022-06-04 NOTE — DISCUSSION/SUMMARY
[FreeTextEntry1] : \par Incomplete bladder emptying-\par Advised the patient that this can be due to an acute UTI or prolapse or other etiologies. Will send her urine to rule out infectious etiology. If not infected then I will recommend further workup with urodynamics (with reduction). Discussed with the patient that we are concerned about incomplete bladder emptying because it can cause recurrent UTI and can cause kidney damage. The patient voiced understanding.\par \par Overflow incontinence-\par Advised that the urinary incontinence can be secondary to incomplete bladder emptying. We will discuss further management options for urinary incontinence If the patient continue to have incontinence after resolvement of incomplete bladder emptying.\par \par

## 2022-06-04 NOTE — PHYSICAL EXAM
[Chaperone Present] : A chaperone was present in the examining room during all aspects of the physical examination [FreeTextEntry1] : Void:  600cc\par PVR:  400c\par Urethra was prepped in sterile fashion and then a sterile catheter (14F) was used by me to drain the bladder. The patient tolerated the procedure well.\par \par Ap: +1  Bp: +1\par  \par Well healed incision: laparoscopic\par normal perineal sensation\par normal perineal reflexes\par negative cough stress test\par positive atrophy\par positive urethral hypermobility\par bilateral levator ani spasm, no tenderness\par no urethral tenderness\par no bladder tenderness\par no cervical tenderness\par 2/5 Kegel

## 2022-06-04 NOTE — COUNSELING
[FreeTextEntry1] : \par We will notify you of the urine results\par \par If there is an infection we will then check to see how well you empty your bladder after treatment\par \par If there is no infection, then I will recommend further evaluation of your bladder with bladder function testing\par \par Followup will be scheduled based on the urine results

## 2022-06-10 ENCOUNTER — APPOINTMENT (OUTPATIENT)
Dept: UROGYNECOLOGY | Facility: CLINIC | Age: 52
End: 2022-06-10
Payer: COMMERCIAL

## 2022-06-10 VITALS
WEIGHT: 161 LBS | BODY MASS INDEX: 30.4 KG/M2 | SYSTOLIC BLOOD PRESSURE: 144 MMHG | DIASTOLIC BLOOD PRESSURE: 83 MMHG | HEART RATE: 78 BPM | HEIGHT: 61 IN

## 2022-06-10 PROCEDURE — 51797 INTRAABDOMINAL PRESSURE TEST: CPT

## 2022-06-10 PROCEDURE — 51741 ELECTRO-UROFLOWMETRY FIRST: CPT

## 2022-06-10 PROCEDURE — 51784 ANAL/URINARY MUSCLE STUDY: CPT

## 2022-06-10 PROCEDURE — 51728 CYSTOMETROGRAM W/VP: CPT

## 2022-06-10 PROCEDURE — 81025 URINE PREGNANCY TEST: CPT

## 2022-06-14 LAB
HCG UR QL: NEGATIVE
QUALITY CONTROL: YES

## 2022-08-15 ENCOUNTER — APPOINTMENT (OUTPATIENT)
Dept: UROGYNECOLOGY | Facility: CLINIC | Age: 52
End: 2022-08-15

## 2022-08-15 VITALS — HEART RATE: 72 BPM | SYSTOLIC BLOOD PRESSURE: 128 MMHG | DIASTOLIC BLOOD PRESSURE: 85 MMHG

## 2022-08-15 PROCEDURE — 99215 OFFICE O/P EST HI 40 MIN: CPT

## 2022-08-15 NOTE — COUNSELING
[FreeTextEntry1] : \par Please schedule the kidney sonogram and let the office know when it is scheduled so that we can followup on the report\par \par Please start the flexeril (muscle relaxant) twice a day. It can make you sleepy\par \par Please call my office if you have any issues with the cost or side effects of the medication.\par \par Schedule 6 week med check with my PAFlavia. PVR check.

## 2022-08-15 NOTE — HISTORY OF PRESENT ILLNESS
[FreeTextEntry1] : \par The patient is here for followup for her incomplete bladder emptying\par NP visit on 5/27/2022: Ap Bp +1 (asymptomatic)\par PVR elevated and UCX negative\par \par 6/10/2022: urodynamics:\par Impression: intermittent incomplete bladder emptying (did not empty well in the office and for the uroflow and emptied well without reduction for the pressure flow study), intermittent void, pelvic floor hypertonicity, urethral jayme, no obstructive voiding\par Plan: start with muscle relaxant and check PVR, if still does not empty well, add flomax, if still does not empty well then try pessary additionally, renal imaging\par \par \par \par

## 2022-08-15 NOTE — DISCUSSION/SUMMARY
[FreeTextEntry1] : \par Incomplete bladder emptying-\par Advised the patient that there a few etiologies that can result in intermittent incomplete bladder emptying (acute UTI which she did not have), constipation (which she does not have now), and pelvic floor hypertonicity (which she has on exam). Advised the patient that I recommend further management for the pelvic muscle hypertonicity and renal imaging to rule out hydro. The patient voiced understanding and agrees with the plan.\par \par Muscle hypertonicity-\par Discussed the pathophysiology of the above condition. Reviewed management options including medications (oral or vaginal suppository), injections, pelvic floor physical therapy, or referral for possible pudendal nerve blocks. The patient agrees to medical management. The risks and benefits of flexeril was reviewed.\par \par

## 2022-08-19 ENCOUNTER — NON-APPOINTMENT (OUTPATIENT)
Age: 52
End: 2022-08-19

## 2022-09-19 ENCOUNTER — LABORATORY RESULT (OUTPATIENT)
Age: 52
End: 2022-09-19

## 2022-09-20 ENCOUNTER — APPOINTMENT (OUTPATIENT)
Dept: OBGYN | Facility: CLINIC | Age: 52
End: 2022-09-20

## 2022-09-20 VITALS — HEIGHT: 61 IN | BODY MASS INDEX: 30.4 KG/M2 | TEMPERATURE: 98 F | WEIGHT: 161 LBS

## 2022-09-20 DIAGNOSIS — N81.6 RECTOCELE: ICD-10-CM

## 2022-09-20 DIAGNOSIS — M85.80 OTHER SPECIFIED DISORDERS OF BONE DENSITY AND STRUCTURE, UNSPECIFIED SITE: ICD-10-CM

## 2022-09-20 PROCEDURE — 99396 PREV VISIT EST AGE 40-64: CPT

## 2022-09-24 PROBLEM — N81.6 RECTOCELE, FEMALE: Status: ACTIVE | Noted: 2022-09-24

## 2022-09-24 PROBLEM — M85.80 OSTEOPENIA, UNSPECIFIED LOCATION: Status: ACTIVE | Noted: 2022-09-24

## 2022-09-28 ENCOUNTER — APPOINTMENT (OUTPATIENT)
Dept: UROGYNECOLOGY | Facility: CLINIC | Age: 52
End: 2022-09-28

## 2022-09-28 VITALS
WEIGHT: 159 LBS | HEIGHT: 61 IN | HEART RATE: 72 BPM | SYSTOLIC BLOOD PRESSURE: 139 MMHG | DIASTOLIC BLOOD PRESSURE: 85 MMHG | BODY MASS INDEX: 30.02 KG/M2

## 2022-09-28 PROCEDURE — 99214 OFFICE O/P EST MOD 30 MIN: CPT | Mod: 25

## 2022-09-28 PROCEDURE — 51701 INSERT BLADDER CATHETER: CPT

## 2022-09-29 NOTE — COUNSELING
[FreeTextEntry1] : If you feel like you have an infection it is important for you to call our office and we will arrange testing of your urine.\par \par Please continue taking Flexeril 5 mg once a day. Refills sent to your pharmacy.\par \par Please call my office if you have any issues with the cost or side effects of the medication. \par \par Schedule a 6 months follow up med check appointment with FADI Alejandro or FADI Prasad.\par

## 2022-09-29 NOTE — PHYSICAL EXAM
[Chaperone Present] : A chaperone was present in the examining room during all aspects of the physical examination [No Acute Distress] : in no acute distress [Well developed] : well developed [Well Nourished] : ~L well nourished [FreeTextEntry1] : Indication: Incomplete Bladder Emptying\par Urethra was prepped in sterile fashion and then a sterile non- indwelling catheter (14F) was used by me to drain the bladder. The patient tolerated the procedure well.\par void: 250 cc\par PVR: 120 cc (normal PVR for this volume voided: 123 cc)

## 2022-09-29 NOTE — DISCUSSION/SUMMARY
[FreeTextEntry1] : \par Incomplete Bladder Emptying\par PVR WNL while taking Flexeril 5 mg once a day\par Patient happy with Flexeril 5 mg once a day\par Refills provided. 90 days supply with 1 refills.\par Precautions reviewed.\par Will return in 6 months for follow up or earlier if she has any issues.

## 2022-09-29 NOTE — HISTORY OF PRESENT ILLNESS
[FreeTextEntry1] : Patient is here for PVR check for incomplete bladder emptying. \par Last seen 8/15/2022 for follow up for incomplete bladder emptying\par \par NP visit on 5/27/2022: Ap Bp +1 (asymptomatic)\par PVR elevated and UCX negative\par \par 6/10/2022: urodynamics:\par Impression: intermittent incomplete bladder emptying (did not empty well in the office and for the uroflow and emptied well without reduction for the pressure flow study), intermittent void, pelvic floor hypertonicity, urethral jayme, no obstructive voiding\par Plan: start with muscle relaxant and check PVR, if still does not empty well, add flomax, if still does not empty well then try pessary additionally, renal imaging\par \par 8/19/22 renal sono-normal \par \par Flexeril 5 mg BID \par \par Today pt states she is happy taking Flexeril 5 mg. She took it for four days twice a day but it made her sleepy and she switched to taking it once a day, she takes it in the morning and is happy. She notes that she does have dry mouth and less of an appetite while taking the Flexeril. She feels that she empties her bladder better. Does not feel that she has an infection today.\par \par Patient would like to continue Flexeril 5 mg once a day\par

## 2023-01-24 DIAGNOSIS — Z00.00 ENCOUNTER FOR GENERAL ADULT MEDICAL EXAMINATION W/OUT ABNORMAL FINDINGS: ICD-10-CM

## 2023-02-03 ENCOUNTER — APPOINTMENT (OUTPATIENT)
Dept: CARDIOLOGY | Facility: CLINIC | Age: 53
End: 2023-02-03
Payer: COMMERCIAL

## 2023-02-03 ENCOUNTER — RESULT CHARGE (OUTPATIENT)
Age: 53
End: 2023-02-03

## 2023-02-03 VITALS
TEMPERATURE: 97.7 F | BODY MASS INDEX: 30.96 KG/M2 | HEART RATE: 77 BPM | HEIGHT: 61 IN | RESPIRATION RATE: 14 BRPM | WEIGHT: 164 LBS | SYSTOLIC BLOOD PRESSURE: 110 MMHG | DIASTOLIC BLOOD PRESSURE: 80 MMHG

## 2023-02-03 DIAGNOSIS — Z82.49 FAMILY HISTORY OF ISCHEMIC HEART DISEASE AND OTHER DISEASES OF THE CIRCULATORY SYSTEM: ICD-10-CM

## 2023-02-03 PROCEDURE — 99214 OFFICE O/P EST MOD 30 MIN: CPT | Mod: 25

## 2023-02-03 PROCEDURE — 93000 ELECTROCARDIOGRAM COMPLETE: CPT

## 2023-02-03 RX ORDER — PANTOPRAZOLE 40 MG/1
40 TABLET, DELAYED RELEASE ORAL DAILY
Refills: 0 | Status: ACTIVE | COMMUNITY

## 2023-02-03 NOTE — HISTORY OF PRESENT ILLNESS
[FreeTextEntry1] : 53 year old female with PMHx of GERD presents for a cardiac evaluation\par \par She denies chest pain or palpitations. Has occasional CASILLAS when she climbs stairs. She does not exercise regularly. \par \par \par Trig 206\par A1c 5.6.\par

## 2023-02-03 NOTE — REVIEW OF SYSTEMS
[Negative] : Neurological [Dyspnea on exertion] : dyspnea during exertion [Chest Discomfort] : no chest discomfort [Lower Ext Edema] : no extremity edema [Leg Claudication] : no intermittent leg claudication

## 2023-02-03 NOTE — ASSESSMENT
[FreeTextEntry1] : 53 YOF\par Significant weight gain in the blast few years with worsening hyperlipidemia and increasing CASILLAS.\par Abnormal ECG. \par \par Plan:\par Weight loss, low-fat diet d/w patient.\par Start Atorvastatin 10 mg daily.\par Exercise stress echocardiogram.\par Will repeat blood work in 3 months.\par \par Heriberto Saleh MD\par

## 2023-02-22 ENCOUNTER — APPOINTMENT (OUTPATIENT)
Dept: CARDIOLOGY | Facility: CLINIC | Age: 53
End: 2023-02-22
Payer: COMMERCIAL

## 2023-02-22 DIAGNOSIS — R06.02 SHORTNESS OF BREATH: ICD-10-CM

## 2023-02-22 PROCEDURE — 93320 DOPPLER ECHO COMPLETE: CPT

## 2023-02-22 PROCEDURE — 93351 STRESS TTE COMPLETE: CPT

## 2023-02-22 PROCEDURE — 93325 DOPPLER ECHO COLOR FLOW MAPG: CPT

## 2023-03-29 ENCOUNTER — APPOINTMENT (OUTPATIENT)
Dept: UROGYNECOLOGY | Facility: CLINIC | Age: 53
End: 2023-03-29
Payer: COMMERCIAL

## 2023-03-29 VITALS
SYSTOLIC BLOOD PRESSURE: 125 MMHG | WEIGHT: 166 LBS | HEIGHT: 61 IN | DIASTOLIC BLOOD PRESSURE: 80 MMHG | BODY MASS INDEX: 31.34 KG/M2 | HEART RATE: 70 BPM

## 2023-03-29 PROCEDURE — 99213 OFFICE O/P EST LOW 20 MIN: CPT

## 2023-03-29 RX ORDER — CYCLOBENZAPRINE HYDROCHLORIDE 5 MG/1
5 TABLET, FILM COATED ORAL
Qty: 90 | Refills: 3 | Status: ACTIVE | COMMUNITY
Start: 2022-08-15 | End: 1900-01-01

## 2023-03-29 NOTE — DISCUSSION/SUMMARY
[FreeTextEntry1] : \par Incomplete Bladder Emptying\par normal PVR at last visit\par \par Muscle hypertonicity\par Patient happy with Flexeril 5 mg once a day\par Refills provided. 90 days supply with 3 refills.\par Precautions reviewed.\par Will return in 12 months for follow up or earlier if she has any issues.\par \par \par

## 2023-03-29 NOTE — COUNSELING
[FreeTextEntry1] : If you feel like you have an infection it is important for you to call our office and we will arrange testing of your urine.\par \par Please continue taking Flexeril 5 mg once a day. Refills sent to your pharmacy.\par \par Please call my office if you have any issues with the cost or side effects of the medication. \par \par Schedule a 12 months follow up med check appointment.\par

## 2023-03-29 NOTE — HISTORY OF PRESENT ILLNESS
[FreeTextEntry1] : Patient is here for 6 months med check for incomplete bladder emptying, muscle hypertonicity.\par Last seen on 9/28/2022 for med check.\par \par NP visit on 5/27/2022: Ap Bp +1 (asymptomatic)\par PVR elevated and UCX negative\par \par 6/10/2022: urodynamics:\par Impression: intermittent incomplete bladder emptying (did not empty well in the office and for the uroflow and emptied well without reduction for the pressure flow study), intermittent void, pelvic floor hypertonicity, urethral jayme, no obstructive voiding\par Plan: start with muscle relaxant and check PVR, if still does not empty well, add flomax, if still does not empty well then try pessary additionally, renal imaging\par \par 8/19/22 renal sono-normal \par \par Flexeril 5 mg BID-took once a day\par Last visit:  (normal for void was 123cc) while on Flexeril 5 mg once a day\par \par Flexeril 5 mg once a day\par \par Today, patient states she is happy with Flexeril 5 mg once a day in the afternoon and is noticing improvement. Occasionally feels drowsy but it is tolerable. She feels 100% improvement of her symptoms. Feels that she empties her bladder well and sees a difference after taking the medication each day. Denies any leakage of urine. Very happy with Flexeril 5 mg. Patient does not feel she has an infection.\par \par Patient would like to continue Flexeril 5 mg daily\par

## 2023-09-12 ENCOUNTER — APPOINTMENT (OUTPATIENT)
Dept: CARDIOLOGY | Facility: CLINIC | Age: 53
End: 2023-09-12

## 2023-09-25 ENCOUNTER — LABORATORY RESULT (OUTPATIENT)
Age: 53
End: 2023-09-25

## 2023-09-26 ENCOUNTER — APPOINTMENT (OUTPATIENT)
Dept: OBGYN | Facility: CLINIC | Age: 53
End: 2023-09-26
Payer: COMMERCIAL

## 2023-09-26 VITALS — WEIGHT: 167 LBS | HEIGHT: 61 IN | TEMPERATURE: 97 F | BODY MASS INDEX: 31.53 KG/M2

## 2023-09-26 DIAGNOSIS — Z01.419 ENCOUNTER FOR GYNECOLOGICAL EXAMINATION (GENERAL) (ROUTINE) W/OUT ABNORMAL FINDINGS: ICD-10-CM

## 2023-09-26 DIAGNOSIS — N39.3 STRESS INCONTINENCE (FEMALE) (MALE): ICD-10-CM

## 2023-09-26 DIAGNOSIS — N81.10 CYSTOCELE, UNSPECIFIED: ICD-10-CM

## 2023-09-26 DIAGNOSIS — Z78.0 ASYMPTOMATIC MENOPAUSAL STATE: ICD-10-CM

## 2023-09-26 PROCEDURE — 99396 PREV VISIT EST AGE 40-64: CPT

## 2023-09-27 PROBLEM — Z78.0 MENOPAUSE: Status: ACTIVE | Noted: 2022-09-24

## 2023-09-27 PROBLEM — N39.3 URINARY, INCONTINENCE, STRESS FEMALE: Status: ACTIVE | Noted: 2022-09-24

## 2023-09-27 PROBLEM — Z01.419 WELL WOMAN EXAM WITH ROUTINE GYNECOLOGICAL EXAM: Status: ACTIVE | Noted: 2018-04-05

## 2023-09-27 PROBLEM — N81.10 FEMALE CYSTOCELE: Status: ACTIVE | Noted: 2021-12-21

## 2023-11-06 ENCOUNTER — APPOINTMENT (OUTPATIENT)
Dept: OBGYN | Facility: CLINIC | Age: 53
End: 2023-11-06
Payer: COMMERCIAL

## 2023-11-06 PROCEDURE — 77080 DXA BONE DENSITY AXIAL: CPT

## 2023-11-30 NOTE — PRE-ANESTHESIA EVALUATION ADULT - BSA (M2)
Continued Stay Note  WESLEY Kaminski     Patient Name: Jennifer Pierson  MRN: [id number]  Today's Date: 11/30/2023    Admit Date: 11/12/2023    Plan: Home   Discharge Plan       Row Name 11/30/23 1346       Plan    Plan Home    Patient/Family in Agreement with Plan yes    Plan Comments Pt is independent and will return home at d/c. No needs identified at this time.                   Discharge Codes    No documentation.                       LEOLA Kelley     1.69

## 2023-12-04 ENCOUNTER — APPOINTMENT (OUTPATIENT)
Dept: CARDIOLOGY | Facility: CLINIC | Age: 53
End: 2023-12-04
Payer: COMMERCIAL

## 2023-12-04 VITALS
BODY MASS INDEX: 31.72 KG/M2 | DIASTOLIC BLOOD PRESSURE: 84 MMHG | WEIGHT: 168 LBS | HEART RATE: 50 BPM | HEIGHT: 61 IN | SYSTOLIC BLOOD PRESSURE: 140 MMHG

## 2023-12-04 DIAGNOSIS — E78.5 HYPERLIPIDEMIA, UNSPECIFIED: ICD-10-CM

## 2023-12-04 DIAGNOSIS — R94.31 ABNORMAL ELECTROCARDIOGRAM [ECG] [EKG]: ICD-10-CM

## 2023-12-04 DIAGNOSIS — I10 ESSENTIAL (PRIMARY) HYPERTENSION: ICD-10-CM

## 2023-12-04 DIAGNOSIS — Z01.810 ENCOUNTER FOR PREPROCEDURAL CARDIOVASCULAR EXAMINATION: ICD-10-CM

## 2023-12-04 PROCEDURE — 99214 OFFICE O/P EST MOD 30 MIN: CPT | Mod: 25

## 2023-12-04 PROCEDURE — 93000 ELECTROCARDIOGRAM COMPLETE: CPT

## 2024-01-09 ENCOUNTER — NON-APPOINTMENT (OUTPATIENT)
Age: 54
End: 2024-01-09

## 2024-03-27 ENCOUNTER — APPOINTMENT (OUTPATIENT)
Dept: UROGYNECOLOGY | Facility: CLINIC | Age: 54
End: 2024-03-27
Payer: COMMERCIAL

## 2024-03-27 VITALS
SYSTOLIC BLOOD PRESSURE: 113 MMHG | BODY MASS INDEX: 31.72 KG/M2 | DIASTOLIC BLOOD PRESSURE: 72 MMHG | HEIGHT: 61 IN | WEIGHT: 168 LBS | HEART RATE: 68 BPM

## 2024-03-27 PROCEDURE — 51701 INSERT BLADDER CATHETER: CPT

## 2024-03-27 PROCEDURE — 99214 OFFICE O/P EST MOD 30 MIN: CPT | Mod: 25

## 2024-03-27 NOTE — HISTORY OF PRESENT ILLNESS
[FreeTextEntry1] : Patient is here for 12 months med check for incomplete bladder emptying, muscle hypertonicity. Last seen on 3/29/2023 for med check.  NP visit on 5/27/2022: Ap Bp +1 (asymptomatic) PVR elevated and UCX negative 6/10/2022: urodynamics: Impression: intermittent incomplete bladder emptying (did not empty well in the office and for the uroflow and emptied well without reduction for the pressure flow study), intermittent void, pelvic floor hypertonicity, urethral jayme, no obstructive voidin Plan: start with muscle relaxant and check PVR, if still does not empty well, add flomax, if still does not empty well then try pessary additionally, renal imaging  8/19/22 renal sono-normal Flexeril 5 mg BID-took once a day 9/22/22  (normal for void was 123cc) while on Flexeril 5 mg once a day  Flexeril 5 mg once a day  Today, patient states she is happy with Flexeril 5 mg once a day in the afternoon as needed and is noticing improvement. Occasionally feels drowsy but it is tolerable. She feels 100% improvement of her symptoms. She had umbilical hernia surgery in December, was taking Flexeril daily after the surgery, and then since February, she has been taking it as needed. Feels that she empties her bladder well and sees a difference after taking the medication as needed. Denies any leakage of urine. Very happy with Flexeril 5 mg. Patient does not feel she has an infection. Notes that she takes it when she knows that she will have intercourse to prevent leakage of urine, or if she feels that she is not emptying her bladder. Only takes it 1-2 times a month. Last took Flexeril on Monday this week.   Patient would like to continue Flexeril 5 mg as needed.

## 2024-03-27 NOTE — REASON FOR VISIT
[TextEntry] : Reason for visit: Follow Up  Voids per day:   3-4 Voids per night:  1  Urge incontinence: Occasional (+) urgency Stress incontinence: No    Constipation: No Fecal incontinence: No Vaginal bulge: No

## 2024-03-27 NOTE — PHYSICAL EXAM
[Chaperone Present] : A chaperone was present in the examining room during all aspects of the physical examination [No Acute Distress] : in no acute distress [Well developed] : well developed [Well Nourished] : ~L well nourished [FreeTextEntry1] : Indication: Incomplete bladder emptying Urethra was prepped in sterile fashion and then a sterile non- indwelling catheter (14F) was used by me to drain the bladder. The patient tolerated the procedure well. void: 30cc PVR: 30 cc

## 2024-03-27 NOTE — END OF VISIT
[TextEntry] :  I, Flavia High PA-C, spent 30 minutes face to face with the patient. This excludes cath

## 2024-03-27 NOTE — DISCUSSION/SUMMARY
[FreeTextEntry1] : Incomplete Bladder Emptying PVR WNL today Patient taking Flexeril 5mg prn (1-2 times a month) Continue Flexeril 5 mg prn Will recheck PVR in a few weeks Precautions reviewed  Urine culture obtained. Will follow up. Will treat accordingly if necessary

## 2024-04-01 LAB — URINE CULTURE <10: NORMAL

## 2024-05-23 ENCOUNTER — APPOINTMENT (OUTPATIENT)
Dept: UROGYNECOLOGY | Facility: CLINIC | Age: 54
End: 2024-05-23
Payer: COMMERCIAL

## 2024-05-23 VITALS
BODY MASS INDEX: 31.91 KG/M2 | DIASTOLIC BLOOD PRESSURE: 83 MMHG | WEIGHT: 169 LBS | HEART RATE: 72 BPM | SYSTOLIC BLOOD PRESSURE: 115 MMHG | HEIGHT: 61 IN

## 2024-05-23 DIAGNOSIS — M62.89 OTHER SPECIFIED DISORDERS OF MUSCLE: ICD-10-CM

## 2024-05-23 DIAGNOSIS — R33.9 RETENTION OF URINE, UNSPECIFIED: ICD-10-CM

## 2024-05-23 PROCEDURE — 99214 OFFICE O/P EST MOD 30 MIN: CPT | Mod: 25

## 2024-05-23 PROCEDURE — 51701 INSERT BLADDER CATHETER: CPT

## 2024-05-23 PROCEDURE — 99459 PELVIC EXAMINATION: CPT

## 2024-05-23 NOTE — HISTORY OF PRESENT ILLNESS
[FreeTextEntry1] : Patient is here for 8 week med check for incomplete bladder emptying, muscle hypertonicity. Last seen on 3/27/2024 for med check.  NP visit on 5/27/2022: Ap Bp +1 (asymptomatic) PVR elevated and UCX negative 6/10/2022: urodynamics: Impression: intermittent incomplete bladder emptying (did not empty well in the office and for the uroflow and emptied well without reduction for the pressure flow study), intermittent void, pelvic floor hypertonicity, urethral jayme, no obstructive voiding Plan: start with muscle relaxant and check PVR, if still does not empty well, add flomax, if still does not empty well then try pessary additionally, renal imaging  8/19/22 renal sono-normal Flexeril 5 mg BID-took once a day 9/22/22  (normal for void was 123cc) while on Flexeril 5 mg once a day  Flexeril 5 mg once a day-- as needed  Today, patient states she takes Flexeril 5 mg as needed, about once a week. Notes that when she has a busy day with a lot of running around, she feels that urine trickles. Then she takes Flexeril and right away sees improvement. Feels that she empties her bladder well. No complaints today. Last took flexeril yesterday. Denies UTI symptoms.   Patient would like to continue Flexeril 5 mg as needed.

## 2024-05-23 NOTE — PHYSICAL EXAM
[Chaperone Present] : A chaperone was present in the examining room during all aspects of the physical examination [[id number]] : A chaperone was present during the pelvic exam. [No Acute Distress] : in no acute distress [Well developed] : well developed [Well Nourished] : ~L well nourished [FreeTextEntry2] : Marcy THORPE [FreeTextEntry1] : Indication: Incomplete bladder emptying Urethra was prepped in sterile fashion and then a sterile non- indwelling catheter (14F) was used by me to drain the bladder. The patient tolerated the procedure well. void: 100 cc PVR: 30 cc

## 2024-05-23 NOTE — COUNSELING
[FreeTextEntry1] : If you feel like you have an infection it is important for you to call our office and we will arrange testing of your urine.  Please continue taking Flexeril 5 mg as needed  We will contact you if the urine results are abnormal.  Please call my office if you have any issues with the cost or side effects of the medication.  Schedule a 6 month follow up visit for PVR check

## 2024-05-23 NOTE — REASON FOR VISIT
[TextEntry] : Reason for visit: Follow Up - PVR Check Voids per day:   3-4 Voids per night:   0-1 Urge incontinence: Occasional, (+) urgency  (+) leakage Stress incontinence: No Constipation: No    Fecal incontinence: No    Vaginal bulge: No

## 2024-05-23 NOTE — DISCUSSION/SUMMARY
[FreeTextEntry1] : Incomplete Bladder Emptying- PVR WNL today Patient taking Flexeril 5mg prn (once a week) Continue Flexeril 5 mg prn Will recheck PVR in 6 months Precautions reviewed Pt will call when she needs refills. If she feels trickling more often and needs to take flexeril more often, advised to call the office so that we can arrange earlier follow up visit.   Urine culture obtained. Will follow up. Will treat accordingly if necessary.

## 2024-05-27 LAB — URINE CULTURE <10: NORMAL

## 2024-05-29 RX ORDER — ATORVASTATIN CALCIUM 10 MG/1
10 TABLET, FILM COATED ORAL
Qty: 90 | Refills: 1 | Status: ACTIVE | COMMUNITY
Start: 2023-02-03 | End: 1900-01-01

## 2024-06-14 ENCOUNTER — TRANSCRIPTION ENCOUNTER (OUTPATIENT)
Age: 54
End: 2024-06-14

## 2024-10-07 ENCOUNTER — LABORATORY RESULT (OUTPATIENT)
Age: 54
End: 2024-10-07

## 2024-10-07 ENCOUNTER — APPOINTMENT (OUTPATIENT)
Dept: OBGYN | Facility: CLINIC | Age: 54
End: 2024-10-07
Payer: COMMERCIAL

## 2024-10-07 VITALS — WEIGHT: 168 LBS | BODY MASS INDEX: 31.72 KG/M2 | TEMPERATURE: 97.1 F | HEIGHT: 61 IN

## 2024-10-07 DIAGNOSIS — M85.80 OTHER SPECIFIED DISORDERS OF BONE DENSITY AND STRUCTURE, UNSPECIFIED SITE: ICD-10-CM

## 2024-10-07 DIAGNOSIS — Z01.419 ENCOUNTER FOR GYNECOLOGICAL EXAMINATION (GENERAL) (ROUTINE) W/OUT ABNORMAL FINDINGS: ICD-10-CM

## 2024-10-07 DIAGNOSIS — Z78.0 ASYMPTOMATIC MENOPAUSAL STATE: ICD-10-CM

## 2024-10-07 DIAGNOSIS — N81.10 CYSTOCELE, UNSPECIFIED: ICD-10-CM

## 2024-10-07 LAB
APPEARANCE: CLEAR
BILIRUBIN URINE: NEGATIVE
BLOOD URINE: NEGATIVE
COLOR: YELLOW
GLUCOSE QUALITATIVE U: NEGATIVE
KETONES URINE: NEGATIVE
LEUKOCYTE ESTERASE URINE: ABNORMAL
NITRITE URINE: NEGATIVE
PH URINE: 7
PROTEIN URINE: NEGATIVE
SPECIFIC GRAVITY URINE: 1.01
UROBILINOGEN URINE: 0.2 (ref 0.2–?)

## 2024-10-07 PROCEDURE — 99396 PREV VISIT EST AGE 40-64: CPT

## 2024-11-07 DIAGNOSIS — Z00.00 ENCOUNTER FOR GENERAL ADULT MEDICAL EXAMINATION W/OUT ABNORMAL FINDINGS: ICD-10-CM

## 2024-11-07 DIAGNOSIS — E78.5 HYPERLIPIDEMIA, UNSPECIFIED: ICD-10-CM

## 2024-11-07 DIAGNOSIS — I10 ESSENTIAL (PRIMARY) HYPERTENSION: ICD-10-CM

## 2024-11-15 ENCOUNTER — APPOINTMENT (OUTPATIENT)
Dept: UROGYNECOLOGY | Facility: CLINIC | Age: 54
End: 2024-11-15
Payer: COMMERCIAL

## 2024-11-15 VITALS
HEART RATE: 63 BPM | DIASTOLIC BLOOD PRESSURE: 75 MMHG | HEIGHT: 61 IN | SYSTOLIC BLOOD PRESSURE: 106 MMHG | BODY MASS INDEX: 32.1 KG/M2 | WEIGHT: 170 LBS

## 2024-11-15 DIAGNOSIS — R33.9 RETENTION OF URINE, UNSPECIFIED: ICD-10-CM

## 2024-11-15 DIAGNOSIS — M62.89 OTHER SPECIFIED DISORDERS OF MUSCLE: ICD-10-CM

## 2024-11-15 DIAGNOSIS — N39.3 STRESS INCONTINENCE (FEMALE) (MALE): ICD-10-CM

## 2024-11-15 DIAGNOSIS — N39.490 OVERFLOW INCONTINENCE: ICD-10-CM

## 2024-11-15 PROCEDURE — 51701 INSERT BLADDER CATHETER: CPT

## 2024-11-15 PROCEDURE — 99459 PELVIC EXAMINATION: CPT

## 2024-11-15 PROCEDURE — 99214 OFFICE O/P EST MOD 30 MIN: CPT | Mod: 25

## 2025-01-10 ENCOUNTER — NON-APPOINTMENT (OUTPATIENT)
Age: 55
End: 2025-01-10

## 2025-01-10 ENCOUNTER — APPOINTMENT (OUTPATIENT)
Dept: CARDIOLOGY | Facility: CLINIC | Age: 55
End: 2025-01-10
Payer: COMMERCIAL

## 2025-01-10 VITALS
HEART RATE: 65 BPM | SYSTOLIC BLOOD PRESSURE: 122 MMHG | DIASTOLIC BLOOD PRESSURE: 78 MMHG | BODY MASS INDEX: 31.53 KG/M2 | WEIGHT: 167 LBS | HEIGHT: 61 IN

## 2025-01-10 DIAGNOSIS — E78.5 HYPERLIPIDEMIA, UNSPECIFIED: ICD-10-CM

## 2025-01-10 DIAGNOSIS — R94.31 ABNORMAL ELECTROCARDIOGRAM [ECG] [EKG]: ICD-10-CM

## 2025-01-10 PROCEDURE — 93000 ELECTROCARDIOGRAM COMPLETE: CPT

## 2025-01-10 PROCEDURE — 99213 OFFICE O/P EST LOW 20 MIN: CPT | Mod: 25

## 2025-02-08 ENCOUNTER — NON-APPOINTMENT (OUTPATIENT)
Age: 55
End: 2025-02-08

## 2025-03-13 ENCOUNTER — RX RENEWAL (OUTPATIENT)
Age: 55
End: 2025-03-13